# Patient Record
Sex: MALE | Race: WHITE | NOT HISPANIC OR LATINO | Employment: FULL TIME | URBAN - METROPOLITAN AREA
[De-identification: names, ages, dates, MRNs, and addresses within clinical notes are randomized per-mention and may not be internally consistent; named-entity substitution may affect disease eponyms.]

---

## 2020-02-28 ENCOUNTER — OFFICE VISIT (OUTPATIENT)
Dept: UROLOGY | Facility: CLINIC | Age: 65
End: 2020-02-28
Payer: COMMERCIAL

## 2020-02-28 VITALS
SYSTOLIC BLOOD PRESSURE: 137 MMHG | DIASTOLIC BLOOD PRESSURE: 72 MMHG | BODY MASS INDEX: 27.98 KG/M2 | HEIGHT: 75 IN | WEIGHT: 225 LBS | RESPIRATION RATE: 19 BRPM | HEART RATE: 79 BPM

## 2020-02-28 DIAGNOSIS — R97.20 ELEVATED PSA: Primary | ICD-10-CM

## 2020-02-28 DIAGNOSIS — N52.9 ERECTILE DYSFUNCTION, UNSPECIFIED ERECTILE DYSFUNCTION TYPE: ICD-10-CM

## 2020-02-28 DIAGNOSIS — N40.1 BPH WITH OBSTRUCTION/LOWER URINARY TRACT SYMPTOMS: ICD-10-CM

## 2020-02-28 DIAGNOSIS — N13.8 BPH WITH OBSTRUCTION/LOWER URINARY TRACT SYMPTOMS: ICD-10-CM

## 2020-02-28 PROCEDURE — 36415 COLL VENOUS BLD VENIPUNCTURE: CPT | Mod: S$GLB,,, | Performed by: NURSE PRACTITIONER

## 2020-02-28 PROCEDURE — 36415 PR COLLECTION VENOUS BLOOD,VENIPUNCTURE: ICD-10-PCS | Mod: S$GLB,,, | Performed by: NURSE PRACTITIONER

## 2020-02-28 PROCEDURE — 99213 PR OFFICE/OUTPT VISIT, EST, LEVL III, 20-29 MIN: ICD-10-PCS | Mod: S$GLB,,, | Performed by: NURSE PRACTITIONER

## 2020-02-28 PROCEDURE — 99213 OFFICE O/P EST LOW 20 MIN: CPT | Mod: S$GLB,,, | Performed by: NURSE PRACTITIONER

## 2020-02-28 RX ORDER — FLUTICASONE PROPIONATE 50 MCG
SPRAY, SUSPENSION (ML) NASAL
COMMUNITY
Start: 2020-01-03

## 2020-02-28 NOTE — PROGRESS NOTES
Chief Complaint: 6 month follow up elevated PSA     HPI:  63-year-old  male known to Dr. Lindsey who presents for six-month follow-up.  Patient was initially referred to us from his PCP for an elevated PSA.  His PSA numbers have decreased since initial findings, and percent free is not too worrisome so we continue to monitor this level closely.  He denies a family history of prostate cancer.  He BPH with minimal lower urinary tract symptoms.  Not taking any prostate medication at this time.  He is due for repeat PSA and JAMIE today.    PSA:  2017  2.72  1/2019  4.21  End of 1/2019 3.95 25 %free  8/22/19 3.3 27 %free    Patient also has a history erectile dysfunction.  He has tried Viagra in the past but it caused significant headache.  He is maintained on Tri Mix which is prescribed by his PCP and tolerates this medication well.    Allergies:  Patient has no known allergies.    Medications:  has a current medication list which includes the following prescription(s): afluria qd 2019-20(3yr up)(pf) and fluticasone propionate.    Review of Systems:  General: No fever, chills, vision changes, dizziness, weakness, fatigue, unexplained weight loss, confusion, or mood swings.  Skin: No rashes, itching, or changes in color/texture of skin.  Chest: Denies LOCKE, cyanosis, wheezing, cough, and sputum production.  Abdomen: Appetite fine. Denies diarrhea, abdominal pain, hematemesis, or blood in stool.  Musculoskeletal: No joint stiffness or swelling. Denies back pain.  : As above.  All other review of systems negative.    PMH:   has a past medical history of Allergy and Elevated PSA.    PSH:   has a past surgical history that includes Hernia repair; Tonsillectomy; Appendectomy; and Knee surgery.    FamHx: family history includes Diabetes in his mother; Hypertension in his mother.    SocHx:  reports that he has never smoked. He has never used smokeless tobacco. He reports that he does not drink alcohol or use drugs.       Physical Exam:  Vitals:    02/28/20 1001   BP: 137/72   Pulse: 79   Resp: 19     General: AAOx3, no apparent distress, no deformities  Neck: supple, no masses, normal thyroid, full ROM  Lungs: CTAB, normal inspiration, no use of accessory muscles  Heart: regular rate and rhythm, no arrhythmias  Abdomen: soft, NT, ND, no masses, no hernias, no hepatosplenomegaly  Lymphatic: no unusually enlarged or tender lymph nodes  Skin: warm and dry, no jaundice.  Ext: without edema or deformity.  : Normal rectal tone, no hemorrhoids. Prostate mildly enlarged, soft, smooth surface, no nodules or masses appreciated.     Labs/Studies: none    Impression/Plan:   Elevated PSA  Comments:  last PSA 3.3t/ 27%free which was improved from previous result; repeat PSA today and call patient with results  Orders:  -     PSA, total and free    BPH with obstruction/lower urinary tract symptoms  Comments:  minimal LUTS, no too bothersome, no prostate meds, JAMIE accomplished and benign    Erectile dysfunction, unspecified erectile dysfunction type  Comments:  stable, failed PDE5 inhibitors, maintained on TriMix that is prescribed by his PCP        Follow up in about 6 months (around 8/28/2020) for repeat PSA/JAMIE due 2/2021.

## 2020-03-05 LAB
PROSTATE SPECIFIC ANTIGEN, TOTAL: 2.7 NG/ML
PSA FREE MFR SERPL: 22 % (CALC)
PSA FREE SERPL-MCNC: 0.6 NG/ML

## 2020-03-06 ENCOUNTER — TELEPHONE (OUTPATIENT)
Dept: UROLOGY | Facility: CLINIC | Age: 65
End: 2020-03-06

## 2020-03-06 NOTE — TELEPHONE ENCOUNTER
Pt contacted, PSA results given, verbalized understanding. NB    ----- Message from Radha Abrams NP sent at 3/5/2020  5:28 PM CST -----  PSA improved, please call him his lab result

## 2020-09-17 ENCOUNTER — TELEPHONE (OUTPATIENT)
Dept: UROLOGY | Facility: CLINIC | Age: 65
End: 2020-09-17

## 2020-09-17 NOTE — TELEPHONE ENCOUNTER
Contacted pt whom states that his appt was canceled and would like to reschedule. Scheduled for 9/21. BJP      ----- Message from Mihaela Alvarez sent at 9/17/2020  8:47 AM CDT -----  Regarding: pt  Pt called regards to his appt. Pt wants to know why it was cancel. Please call back at [phone]

## 2020-09-21 ENCOUNTER — OFFICE VISIT (OUTPATIENT)
Dept: UROLOGY | Facility: CLINIC | Age: 65
End: 2020-09-21
Payer: COMMERCIAL

## 2020-09-21 VITALS
BODY MASS INDEX: 26.73 KG/M2 | DIASTOLIC BLOOD PRESSURE: 70 MMHG | HEIGHT: 75 IN | HEART RATE: 79 BPM | RESPIRATION RATE: 19 BRPM | WEIGHT: 215 LBS | SYSTOLIC BLOOD PRESSURE: 102 MMHG

## 2020-09-21 DIAGNOSIS — R97.20 ELEVATED PSA: ICD-10-CM

## 2020-09-21 DIAGNOSIS — N52.9 ERECTILE DYSFUNCTION, UNSPECIFIED ERECTILE DYSFUNCTION TYPE: Primary | ICD-10-CM

## 2020-09-21 DIAGNOSIS — N40.0 BPH WITHOUT URINARY OBSTRUCTION: ICD-10-CM

## 2020-09-21 PROCEDURE — 99213 OFFICE O/P EST LOW 20 MIN: CPT | Mod: S$GLB,,, | Performed by: SPECIALIST

## 2020-09-21 PROCEDURE — 99213 PR OFFICE/OUTPT VISIT, EST, LEVL III, 20-29 MIN: ICD-10-PCS | Mod: S$GLB,,, | Performed by: SPECIALIST

## 2020-09-21 RX ORDER — METFORMIN HYDROCHLORIDE 1000 MG/1
TABLET ORAL
COMMUNITY
Start: 2020-07-23

## 2020-09-21 RX ORDER — LISINOPRIL 5 MG/1
TABLET ORAL
COMMUNITY
Start: 2020-08-04

## 2020-09-21 RX ORDER — DEXTROAMPHETAMINE SACCHARATE, AMPHETAMINE ASPARTATE MONOHYDRATE, DEXTROAMPHETAMINE SULFATE AND AMPHETAMINE SULFATE 7.5; 7.5; 7.5; 7.5 MG/1; MG/1; MG/1; MG/1
30 CAPSULE, EXTENDED RELEASE ORAL EVERY MORNING
COMMUNITY
End: 2021-07-14

## 2020-09-21 RX ORDER — HYDROXYZINE HYDROCHLORIDE 25 MG/1
25 TABLET, FILM COATED ORAL 3 TIMES DAILY PRN
COMMUNITY
Start: 2020-06-29 | End: 2021-03-22

## 2020-09-21 RX ORDER — SIMVASTATIN 20 MG/1
TABLET, FILM COATED ORAL
COMMUNITY
Start: 2020-07-23

## 2020-09-21 RX ORDER — AMLODIPINE BESYLATE 2.5 MG/1
TABLET ORAL
COMMUNITY
Start: 2020-08-04 | End: 2022-05-09

## 2020-09-21 NOTE — PROGRESS NOTES
Subjective:       Patient ID: Fidel Hernandez is a 64 y.o. male.    Chief Complaint: Follow-up (6 th f/u)      HPI:  64-year-old man patient of mine who is here today for follow-up.  He was initially sent to us for elevated PSA.  We obtained a PSA from February of 2020 and was 2.7 ng/mL with a 22% free.  The previous PSA number was from August of 2019 and was 3.3 with 27% free.  We decided to observe him.  He underwent a digital rectal exam in February of 2020 as well which was benign.  See below for PSA history.    PSA History:   2017                 2.72  1/2019              4.21  End of 1/2019  3.95     25% free  8/22/19             3.3       27% free  02/2020            2.7       22% free    He has BPH without bothersome lower urinary tract symptoms.  He is not taking Flomax.  He empties his bladder very well.    He has problems with erections that the going on for several months of progressively worsening.  He has tried PD 5 inhibitors without success.  They do cause headache without inadequate satisfactory erection.  He has currently been on Tri Mix.  I am not sure of the quantity of the different components in the Tri Mix that he is using.  He uses 0.4 cc without a decent erection.  He is here today to explore different opportunities for the management of his erections.    Past Medical History:   Past Medical History:   Diagnosis Date    ADHD     Allergy     Diabetes mellitus     Elevated PSA     Hypercholesterolemia     Hypertension        Past Surgical Historical:   Past Surgical History:   Procedure Laterality Date    APPENDECTOMY      HERNIA REPAIR      KNEE SURGERY      TONSILLECTOMY          Medications:   Medication List with Changes/Refills   Current Medications    AFLURIA QD 2019-20,3YR UP,,PF, 60 MCG (15 MCG X 4)/0.5 ML SYRG    ADM 0.5ML IM UTD    AMLODIPINE (NORVASC) 2.5 MG TABLET        DEXTROAMPHETAMINE-AMPHETAMINE (ADDERALL XR) 30 MG 24 HR CAPSULE    Take 30 mg by mouth every morning.     FLUTICASONE PROPIONATE (FLONASE) 50 MCG/ACTUATION NASAL SPRAY    2 sprays to each nare daily    HYDROXYZINE HCL (ATARAX) 25 MG TABLET    Take 25 mg by mouth 3 (three) times daily as needed.    LISINOPRIL (PRINIVIL,ZESTRIL) 5 MG TABLET        METFORMIN (GLUCOPHAGE) 1000 MG TABLET        SIMVASTATIN (ZOCOR) 20 MG TABLET            Past Social History:   Social History     Socioeconomic History    Marital status:      Spouse name: Not on file    Number of children: Not on file    Years of education: Not on file    Highest education level: Not on file   Occupational History    Not on file   Social Needs    Financial resource strain: Not on file    Food insecurity     Worry: Not on file     Inability: Not on file    Transportation needs     Medical: Not on file     Non-medical: Not on file   Tobacco Use    Smoking status: Never Smoker    Smokeless tobacco: Never Used   Substance and Sexual Activity    Alcohol use: Never     Frequency: Never    Drug use: Never    Sexual activity: Not on file   Lifestyle    Physical activity     Days per week: Not on file     Minutes per session: Not on file    Stress: Not on file   Relationships    Social connections     Talks on phone: Not on file     Gets together: Not on file     Attends Jain service: Not on file     Active member of club or organization: Not on file     Attends meetings of clubs or organizations: Not on file     Relationship status: Not on file   Other Topics Concern    Not on file   Social History Narrative    Not on file       Allergies: Review of patient's allergies indicates:  No Known Allergies     Family History:   Family History   Problem Relation Age of Onset    Diabetes Mother     Hypertension Mother         Review of Systems:   systems reviewed and notable for erectile dysfunction and BPH.  History of elevated PSA  All other systems were reviewed Neg except as stated in the HPI    Physical Exam:  General: A&Ox3. No apparent  distress. No deformities.  Neck: No masses. Normal thyroid.  Lungs: normal inspiration. No use of accessory muscles.  Heart: normal pulse. No arrhythmias.  Abdomen: Soft. NT. ND. No masses. No hernias. No hepatosplenomegaly.  Lymphatic: Neck and groin nodes negative.  Skin: The skin is warm and dry. No jaundice.  Neurology: Cranial nerves 2-12 crossly intact, no focal weaknesses, no sensation deficits, no motor deficits  Ext: No clubbing, cyanosis or edema.  :  Deferred      Assessment/Plan:       64-year-old man presented to me today with worsening erectile dysfunction.    1.  Obtain blood for serum testosterone levels today  2.  We talked about a penile prosthesis.  He seems like that will be his next best option.  We reviewed the data on the penile prosthesis as well as looked at some films.  All his questions were addressed satisfactorily.  I talked to him at length about stretch penile length which is the closest measure of he has erectile length with the prosthesis in place.  3.  Patient return to clinic in 6 months for interim check  4.  Continue monitoring his PSA    Problem List Items Addressed This Visit     None      Visit Diagnoses     Erectile dysfunction, unspecified erectile dysfunction type    -  Primary    Relevant Orders    Testosterone    Elevated PSA        BPH without urinary obstruction

## 2020-09-24 ENCOUNTER — TELEPHONE (OUTPATIENT)
Dept: UROLOGY | Facility: CLINIC | Age: 65
End: 2020-09-24

## 2020-09-24 DIAGNOSIS — R97.20 ELEVATED PSA: Primary | ICD-10-CM

## 2020-09-24 NOTE — TELEPHONE ENCOUNTER
Fasting T-level order faxed to [facility] draw station [phone]. Note added on lab order to fax results. NB

## 2020-09-24 NOTE — TELEPHONE ENCOUNTER
Callback to pt, informed him that his T-levels are low and we would like to get him to come in for a fasting t-level draw. Pt verbalized understanding and requesting that he do it in [place] at [facility], informed pt that the order will be sent. KISHOR    ----- Message from Malena Dwyer sent at 9/24/2020 11:04 AM CDT -----  Type:  Patient Returning Call    Who Called:pt   Who Left Message for Patient:na  Does the patient know what this is regarding?:na  Would the patient rather a call back or a response via [facility]? Callback   Best Call Back Number:[phone]   Additional Information:

## 2020-09-30 ENCOUNTER — TELEPHONE (OUTPATIENT)
Dept: UROLOGY | Facility: CLINIC | Age: 65
End: 2020-09-30

## 2020-09-30 NOTE — TELEPHONE ENCOUNTER
64/M with worsening ED (on TriMix & considering IPP)    Testosterone level on 9/21/2020 resulted at 252 ng/dL. Fasting testosterone level ordered on 9/28/2020 resulted at 300 ng/dL.    Were you wanting to start TRT?

## 2020-10-01 ENCOUNTER — TELEPHONE (OUTPATIENT)
Dept: UROLOGY | Facility: CLINIC | Age: 65
End: 2020-10-01

## 2020-10-01 NOTE — TELEPHONE ENCOUNTER
----- Message from Carolyn Godinez sent at 10/1/2020 10:50 AM CDT -----  .Type:  Test Results    Who Called: Patient   Name of Test (Lab/Mammo/Etc):  blood work   Date of Test:   Ordering Provider: dr. Lindsey   Where the test was performed: [facility]   Would the patient rather a call back or a response via [facility]?  call  Best Call Back Number: [phone]  Additional Information:  n/a

## 2020-10-01 NOTE — TELEPHONE ENCOUNTER
Attempted to return patient's call, we are attempting to reach him to see if he is interested in TRT as his T-level is low, no answer, LVM to call back.

## 2020-10-01 NOTE — TELEPHONE ENCOUNTER
Attempted to call patient to update on plan of care, if he calls back let him know that his testosterone level is low and we can start him on replacement therapy if he is interested. Let me know if he is and I can put the order in.

## 2020-10-01 NOTE — TELEPHONE ENCOUNTER
We can attempt a 3 month trial of TRT if he has no contraindications.  And see if that helps some.  I think 3 months would be reasonable time.

## 2020-10-01 NOTE — TELEPHONE ENCOUNTER
Contacted pt informed him of his testosterone levels being low, advised him if he would like we could start him on replacement therapy if he is interested. Pt states that his work schedule is pretty tight at the moment and states that a patch or pill will work better for him. I informed pt that I would inform  And will contact him with more information. Pt verbalized understanding. KISHOR

## 2021-03-22 ENCOUNTER — OFFICE VISIT (OUTPATIENT)
Dept: UROLOGY | Facility: CLINIC | Age: 66
End: 2021-03-22
Payer: MEDICARE

## 2021-03-22 VITALS
HEIGHT: 75 IN | WEIGHT: 230 LBS | SYSTOLIC BLOOD PRESSURE: 137 MMHG | HEART RATE: 81 BPM | DIASTOLIC BLOOD PRESSURE: 75 MMHG | BODY MASS INDEX: 28.6 KG/M2

## 2021-03-22 DIAGNOSIS — E29.1 HYPOGONADISM IN MALE: ICD-10-CM

## 2021-03-22 DIAGNOSIS — R97.20 ELEVATED PSA: Primary | ICD-10-CM

## 2021-03-22 DIAGNOSIS — N13.8 BPH WITH OBSTRUCTION/LOWER URINARY TRACT SYMPTOMS: ICD-10-CM

## 2021-03-22 DIAGNOSIS — N52.9 ERECTILE DYSFUNCTION, UNSPECIFIED ERECTILE DYSFUNCTION TYPE: ICD-10-CM

## 2021-03-22 DIAGNOSIS — N40.1 BPH WITH OBSTRUCTION/LOWER URINARY TRACT SYMPTOMS: ICD-10-CM

## 2021-03-22 LAB
PSA, DIAGNOSTIC: 5.31 NG/ML (ref 0–4)
TESTOST SERPL-MCNC: 226 NG/DL (ref 193–740)

## 2021-03-22 PROCEDURE — 96372 THER/PROPH/DIAG INJ SC/IM: CPT | Mod: S$GLB,,, | Performed by: SPECIALIST

## 2021-03-22 PROCEDURE — 81001 URINALYSIS AUTO W/SCOPE: CPT | Mod: S$GLB,,, | Performed by: NURSE PRACTITIONER

## 2021-03-22 PROCEDURE — 99214 OFFICE O/P EST MOD 30 MIN: CPT | Mod: 25,S$GLB,, | Performed by: SPECIALIST

## 2021-03-22 PROCEDURE — 81001 PR  URINALYSIS, AUTO, W/SCOPE: ICD-10-PCS | Mod: S$GLB,,, | Performed by: NURSE PRACTITIONER

## 2021-03-22 PROCEDURE — 36415 PR COLLECTION VENOUS BLOOD,VENIPUNCTURE: ICD-10-PCS | Mod: S$GLB,,, | Performed by: NURSE PRACTITIONER

## 2021-03-22 PROCEDURE — 96372 PR INJECTION,THERAP/PROPH/DIAG2ST, IM OR SUBCUT: ICD-10-PCS | Mod: S$GLB,,, | Performed by: SPECIALIST

## 2021-03-22 PROCEDURE — 36415 COLL VENOUS BLD VENIPUNCTURE: CPT | Mod: S$GLB,,, | Performed by: NURSE PRACTITIONER

## 2021-03-22 PROCEDURE — 99214 PR OFFICE/OUTPT VISIT, EST, LEVL IV, 30-39 MIN: ICD-10-PCS | Mod: 25,S$GLB,, | Performed by: SPECIALIST

## 2021-03-22 RX ORDER — TRIAMCINOLONE ACETONIDE 1 MG/G
CREAM TOPICAL
COMMUNITY
Start: 2021-03-03 | End: 2022-05-09

## 2021-03-22 RX ORDER — ALLOPURINOL 100 MG/1
100 TABLET ORAL DAILY
COMMUNITY
End: 2022-05-09 | Stop reason: DRUGHIGH

## 2021-03-22 RX ORDER — TESTOSTERONE CYPIONATE 200 MG/ML
200 INJECTION, SOLUTION INTRAMUSCULAR
Status: SHIPPED | OUTPATIENT
Start: 2021-03-22 | End: 2021-06-14

## 2021-03-22 RX ORDER — DEXTROAMPHETAMINE SACCHARATE, AMPHETAMINE ASPARTATE, DEXTROAMPHETAMINE SULFATE AND AMPHETAMINE SULFATE 5; 5; 5; 5 MG/1; MG/1; MG/1; MG/1
TABLET ORAL
COMMUNITY
Start: 2021-02-04

## 2021-03-22 RX ADMIN — TESTOSTERONE CYPIONATE 200 MG: 200 INJECTION, SOLUTION INTRAMUSCULAR at 10:03

## 2021-03-23 ENCOUNTER — TELEPHONE (OUTPATIENT)
Dept: UROLOGY | Facility: CLINIC | Age: 66
End: 2021-03-23

## 2021-03-23 DIAGNOSIS — R97.20 ELEVATED PSA: Primary | ICD-10-CM

## 2021-04-06 ENCOUNTER — CLINICAL SUPPORT (OUTPATIENT)
Dept: UROLOGY | Facility: CLINIC | Age: 66
End: 2021-04-06
Payer: MEDICARE

## 2021-04-06 DIAGNOSIS — E29.1 HYPOGONADISM IN MALE: Primary | ICD-10-CM

## 2021-04-06 PROCEDURE — 96372 PR INJECTION,THERAP/PROPH/DIAG2ST, IM OR SUBCUT: ICD-10-PCS | Mod: S$GLB,,, | Performed by: SPECIALIST

## 2021-04-06 PROCEDURE — 96372 THER/PROPH/DIAG INJ SC/IM: CPT | Mod: S$GLB,,, | Performed by: SPECIALIST

## 2021-04-06 RX ORDER — TESTOSTERONE CYPIONATE 200 MG/ML
200 INJECTION, SOLUTION INTRAMUSCULAR
Status: SHIPPED | OUTPATIENT
Start: 2021-04-06 | End: 2021-06-29

## 2021-04-06 RX ADMIN — TESTOSTERONE CYPIONATE 200 MG: 200 INJECTION, SOLUTION INTRAMUSCULAR at 09:04

## 2021-04-09 ENCOUNTER — PROCEDURE VISIT (OUTPATIENT)
Dept: UROLOGY | Facility: CLINIC | Age: 66
End: 2021-04-09
Payer: MEDICARE

## 2021-04-09 VITALS
RESPIRATION RATE: 18 BRPM | SYSTOLIC BLOOD PRESSURE: 133 MMHG | OXYGEN SATURATION: 98 % | DIASTOLIC BLOOD PRESSURE: 67 MMHG | BODY MASS INDEX: 29.42 KG/M2 | HEIGHT: 75 IN | WEIGHT: 236.63 LBS | HEART RATE: 74 BPM

## 2021-04-09 DIAGNOSIS — R97.20 ELEVATED PSA: Primary | ICD-10-CM

## 2021-04-09 PROCEDURE — 76872 US TRANSRECTAL: CPT | Mod: S$GLB,,, | Performed by: SPECIALIST

## 2021-04-09 PROCEDURE — 55700 PR BIOPSY OF PROSTATE,NEEDLE/PUNCH: ICD-10-PCS | Mod: S$GLB,,, | Performed by: SPECIALIST

## 2021-04-09 PROCEDURE — 55700 PR BIOPSY OF PROSTATE,NEEDLE/PUNCH: CPT | Mod: S$GLB,,, | Performed by: SPECIALIST

## 2021-04-09 PROCEDURE — 96372 PR INJECTION,THERAP/PROPH/DIAG2ST, IM OR SUBCUT: ICD-10-PCS | Mod: 59,S$GLB,, | Performed by: SPECIALIST

## 2021-04-09 PROCEDURE — 96372 THER/PROPH/DIAG INJ SC/IM: CPT | Mod: 59,S$GLB,, | Performed by: SPECIALIST

## 2021-04-09 PROCEDURE — 76872 PR US TRANSRECTAL: ICD-10-PCS | Mod: S$GLB,,, | Performed by: SPECIALIST

## 2021-04-09 RX ORDER — CEFTRIAXONE 1 G/1
1 INJECTION, POWDER, FOR SOLUTION INTRAMUSCULAR; INTRAVENOUS
Status: COMPLETED | OUTPATIENT
Start: 2021-04-09 | End: 2021-04-09

## 2021-04-09 RX ORDER — DIAZEPAM 10 MG/1
10 TABLET ORAL
Status: COMPLETED | OUTPATIENT
Start: 2021-04-09 | End: 2021-04-09

## 2021-04-09 RX ADMIN — CEFTRIAXONE 1 G: 1 INJECTION, POWDER, FOR SOLUTION INTRAMUSCULAR; INTRAVENOUS at 01:04

## 2021-04-09 RX ADMIN — DIAZEPAM 10 MG: 10 TABLET ORAL at 01:04

## 2021-04-20 ENCOUNTER — CLINICAL SUPPORT (OUTPATIENT)
Dept: UROLOGY | Facility: CLINIC | Age: 66
End: 2021-04-20
Payer: MEDICARE

## 2021-04-20 DIAGNOSIS — E29.1 HYPOGONADISM IN MALE: Primary | ICD-10-CM

## 2021-04-20 PROCEDURE — 96372 THER/PROPH/DIAG INJ SC/IM: CPT | Mod: S$GLB,,, | Performed by: SPECIALIST

## 2021-04-20 PROCEDURE — 96372 PR INJECTION,THERAP/PROPH/DIAG2ST, IM OR SUBCUT: ICD-10-PCS | Mod: S$GLB,,, | Performed by: SPECIALIST

## 2021-04-20 RX ADMIN — TESTOSTERONE CYPIONATE 200 MG: 200 INJECTION, SOLUTION INTRAMUSCULAR at 09:04

## 2021-04-23 ENCOUNTER — OFFICE VISIT (OUTPATIENT)
Dept: UROLOGY | Facility: CLINIC | Age: 66
End: 2021-04-23
Payer: MEDICARE

## 2021-04-23 VITALS
RESPIRATION RATE: 16 BRPM | HEIGHT: 75 IN | DIASTOLIC BLOOD PRESSURE: 72 MMHG | SYSTOLIC BLOOD PRESSURE: 151 MMHG | BODY MASS INDEX: 29.34 KG/M2 | WEIGHT: 236 LBS

## 2021-04-23 DIAGNOSIS — N13.8 BPH WITH OBSTRUCTION/LOWER URINARY TRACT SYMPTOMS: ICD-10-CM

## 2021-04-23 DIAGNOSIS — E29.1 HYPOGONADISM IN MALE: ICD-10-CM

## 2021-04-23 DIAGNOSIS — N40.1 BPH WITH OBSTRUCTION/LOWER URINARY TRACT SYMPTOMS: ICD-10-CM

## 2021-04-23 DIAGNOSIS — N52.9 ERECTILE DYSFUNCTION, UNSPECIFIED ERECTILE DYSFUNCTION TYPE: ICD-10-CM

## 2021-04-23 DIAGNOSIS — R97.20 ELEVATED PSA: Primary | ICD-10-CM

## 2021-04-23 PROCEDURE — 99213 PR OFFICE/OUTPT VISIT, EST, LEVL III, 20-29 MIN: ICD-10-PCS | Mod: S$GLB,,, | Performed by: SPECIALIST

## 2021-04-23 PROCEDURE — 99213 OFFICE O/P EST LOW 20 MIN: CPT | Mod: S$GLB,,, | Performed by: SPECIALIST

## 2021-04-23 RX ORDER — VITAMIN B COMPLEX
1 CAPSULE ORAL DAILY
COMMUNITY

## 2021-04-23 RX ORDER — DOCUSATE SODIUM 250 MG
250 CAPSULE ORAL DAILY
COMMUNITY

## 2021-05-04 ENCOUNTER — CLINICAL SUPPORT (OUTPATIENT)
Dept: UROLOGY | Facility: CLINIC | Age: 66
End: 2021-05-04
Payer: MEDICARE

## 2021-05-04 DIAGNOSIS — E29.1 HYPOGONADISM IN MALE: Primary | ICD-10-CM

## 2021-05-04 PROCEDURE — 96372 PR INJECTION,THERAP/PROPH/DIAG2ST, IM OR SUBCUT: ICD-10-PCS | Mod: S$GLB,,, | Performed by: SPECIALIST

## 2021-05-04 PROCEDURE — 96372 THER/PROPH/DIAG INJ SC/IM: CPT | Mod: S$GLB,,, | Performed by: SPECIALIST

## 2021-05-04 RX ADMIN — TESTOSTERONE CYPIONATE 200 MG: 200 INJECTION, SOLUTION INTRAMUSCULAR at 09:05

## 2021-06-16 ENCOUNTER — CLINICAL SUPPORT (OUTPATIENT)
Dept: UROLOGY | Facility: CLINIC | Age: 66
End: 2021-06-16
Payer: MEDICARE

## 2021-06-16 DIAGNOSIS — E29.1 HYPOGONADISM MALE: Primary | ICD-10-CM

## 2021-06-16 PROCEDURE — 96372 THER/PROPH/DIAG INJ SC/IM: CPT | Mod: S$GLB,,, | Performed by: SPECIALIST

## 2021-06-16 PROCEDURE — 96372 PR INJECTION,THERAP/PROPH/DIAG2ST, IM OR SUBCUT: ICD-10-PCS | Mod: S$GLB,,, | Performed by: SPECIALIST

## 2021-06-16 RX ADMIN — TESTOSTERONE CYPIONATE 200 MG: 200 INJECTION, SOLUTION INTRAMUSCULAR at 09:06

## 2021-06-30 ENCOUNTER — CLINICAL SUPPORT (OUTPATIENT)
Dept: UROLOGY | Facility: CLINIC | Age: 66
End: 2021-06-30
Payer: MEDICARE

## 2021-06-30 DIAGNOSIS — E29.1 HYPOGONADISM MALE: Primary | ICD-10-CM

## 2021-06-30 PROCEDURE — 96372 THER/PROPH/DIAG INJ SC/IM: CPT | Mod: S$GLB,,, | Performed by: SPECIALIST

## 2021-06-30 PROCEDURE — 96372 PR INJECTION,THERAP/PROPH/DIAG2ST, IM OR SUBCUT: ICD-10-PCS | Mod: S$GLB,,, | Performed by: SPECIALIST

## 2021-06-30 RX ADMIN — TESTOSTERONE CYPIONATE 200 MG: 200 INJECTION, SOLUTION INTRAMUSCULAR at 09:06

## 2021-07-14 ENCOUNTER — OFFICE VISIT (OUTPATIENT)
Dept: UROLOGY | Facility: CLINIC | Age: 66
End: 2021-07-14
Payer: MEDICARE

## 2021-07-14 DIAGNOSIS — N52.9 ERECTILE DYSFUNCTION, UNSPECIFIED ERECTILE DYSFUNCTION TYPE: ICD-10-CM

## 2021-07-14 DIAGNOSIS — E29.1 HYPOGONADISM MALE: ICD-10-CM

## 2021-07-14 DIAGNOSIS — R97.20 ELEVATED PSA: Primary | ICD-10-CM

## 2021-07-14 PROCEDURE — 96372 THER/PROPH/DIAG INJ SC/IM: CPT | Mod: S$GLB,,, | Performed by: NURSE PRACTITIONER

## 2021-07-14 PROCEDURE — 99214 OFFICE O/P EST MOD 30 MIN: CPT | Mod: 25,S$GLB,, | Performed by: NURSE PRACTITIONER

## 2021-07-14 PROCEDURE — 96372 PR INJECTION,THERAP/PROPH/DIAG2ST, IM OR SUBCUT: ICD-10-PCS | Mod: S$GLB,,, | Performed by: NURSE PRACTITIONER

## 2021-07-14 PROCEDURE — 99214 PR OFFICE/OUTPT VISIT, EST, LEVL IV, 30-39 MIN: ICD-10-PCS | Mod: 25,S$GLB,, | Performed by: NURSE PRACTITIONER

## 2021-07-14 RX ORDER — TESTOSTERONE CYPIONATE 200 MG/ML
200 INJECTION, SOLUTION INTRAMUSCULAR ONCE
Status: COMPLETED | OUTPATIENT
Start: 2021-07-14 | End: 2021-07-14

## 2021-07-14 RX ADMIN — TESTOSTERONE CYPIONATE 200 MG: 200 INJECTION, SOLUTION INTRAMUSCULAR at 09:07

## 2021-07-26 ENCOUNTER — TELEPHONE (OUTPATIENT)
Dept: UROLOGY | Facility: CLINIC | Age: 66
End: 2021-07-26

## 2021-07-28 ENCOUNTER — OFFICE VISIT (OUTPATIENT)
Dept: UROLOGY | Facility: CLINIC | Age: 66
End: 2021-07-28
Payer: MEDICARE

## 2021-07-28 DIAGNOSIS — N52.9 ERECTILE DYSFUNCTION, UNSPECIFIED ERECTILE DYSFUNCTION TYPE: ICD-10-CM

## 2021-07-28 DIAGNOSIS — E29.1 HYPOGONADISM MALE: ICD-10-CM

## 2021-07-28 DIAGNOSIS — R97.20 ELEVATED PSA: Primary | ICD-10-CM

## 2021-07-28 PROCEDURE — 99214 PR OFFICE/OUTPT VISIT, EST, LEVL IV, 30-39 MIN: ICD-10-PCS | Mod: S$GLB,,, | Performed by: NURSE PRACTITIONER

## 2021-07-28 PROCEDURE — 99214 OFFICE O/P EST MOD 30 MIN: CPT | Mod: S$GLB,,, | Performed by: NURSE PRACTITIONER

## 2021-09-20 ENCOUNTER — OFFICE VISIT (OUTPATIENT)
Dept: UROLOGY | Facility: CLINIC | Age: 66
End: 2021-09-20
Payer: MEDICARE

## 2021-09-20 VITALS
WEIGHT: 236 LBS | SYSTOLIC BLOOD PRESSURE: 126 MMHG | BODY MASS INDEX: 29.34 KG/M2 | HEIGHT: 75 IN | DIASTOLIC BLOOD PRESSURE: 67 MMHG | RESPIRATION RATE: 18 BRPM | HEART RATE: 76 BPM

## 2021-09-20 DIAGNOSIS — E29.1 HYPOGONADISM MALE: ICD-10-CM

## 2021-09-20 DIAGNOSIS — R97.20 ELEVATED PSA: Primary | ICD-10-CM

## 2021-09-20 PROCEDURE — 99214 OFFICE O/P EST MOD 30 MIN: CPT | Mod: S$GLB,,, | Performed by: NURSE PRACTITIONER

## 2021-09-20 PROCEDURE — 99214 PR OFFICE/OUTPT VISIT, EST, LEVL IV, 30-39 MIN: ICD-10-PCS | Mod: S$GLB,,, | Performed by: NURSE PRACTITIONER

## 2021-09-20 RX ORDER — CIPROFLOXACIN 500 MG/1
500 TABLET ORAL 2 TIMES DAILY
Qty: 28 TABLET | Refills: 0 | Status: SHIPPED | OUTPATIENT
Start: 2021-09-20 | End: 2021-10-04

## 2021-11-08 ENCOUNTER — OFFICE VISIT (OUTPATIENT)
Dept: UROLOGY | Facility: CLINIC | Age: 66
End: 2021-11-08
Payer: MEDICARE

## 2021-11-08 DIAGNOSIS — N52.9 ERECTILE DYSFUNCTION, UNSPECIFIED ERECTILE DYSFUNCTION TYPE: ICD-10-CM

## 2021-11-08 DIAGNOSIS — R97.20 ELEVATED PSA: Primary | ICD-10-CM

## 2021-11-08 DIAGNOSIS — E29.1 HYPOGONADISM MALE: ICD-10-CM

## 2021-11-08 PROCEDURE — 99214 PR OFFICE/OUTPT VISIT, EST, LEVL IV, 30-39 MIN: ICD-10-PCS | Mod: S$GLB,,, | Performed by: NURSE PRACTITIONER

## 2021-11-08 PROCEDURE — 99214 OFFICE O/P EST MOD 30 MIN: CPT | Mod: S$GLB,,, | Performed by: NURSE PRACTITIONER

## 2022-01-14 LAB — PSA, DIAGNOSTIC: 6.99 NG/ML (ref 0–4)

## 2022-01-18 ENCOUNTER — TELEPHONE (OUTPATIENT)
Dept: UROLOGY | Facility: CLINIC | Age: 67
End: 2022-01-18
Payer: MEDICARE

## 2022-01-18 RX ORDER — CIPROFLOXACIN 500 MG/1
500 TABLET ORAL 2 TIMES DAILY
Qty: 28 TABLET | Refills: 0 | Status: SHIPPED | OUTPATIENT
Start: 2022-01-18 | End: 2022-02-01

## 2022-01-18 NOTE — TELEPHONE ENCOUNTER
----- Message from Orlando Kaplan NP sent at 1/18/2022  9:02 AM CST -----  PSA has increased since October.  Was 5.81 and is now 6.99.  Negative biopsy 04/2021.  Treat with antibiotics and change next visit scheduled with me to Dr. Lin.  Cipro sent to [facility] in [place].

## 2022-01-21 ENCOUNTER — OFFICE VISIT (OUTPATIENT)
Dept: UROLOGY | Facility: CLINIC | Age: 67
End: 2022-01-21
Payer: MEDICARE

## 2022-01-21 DIAGNOSIS — R97.20 ELEVATED PSA: Primary | ICD-10-CM

## 2022-01-21 PROCEDURE — 99213 OFFICE O/P EST LOW 20 MIN: CPT | Mod: S$GLB,,, | Performed by: NURSE PRACTITIONER

## 2022-01-21 PROCEDURE — 99213 PR OFFICE/OUTPT VISIT, EST, LEVL III, 20-29 MIN: ICD-10-PCS | Mod: S$GLB,,, | Performed by: NURSE PRACTITIONER

## 2022-01-21 NOTE — TELEPHONE ENCOUNTER
Called pt to notify, he stated he is on his way here for appointment so I advised we see him then.

## 2022-01-21 NOTE — PROGRESS NOTES
Subjective:       Patient ID: Fidel Hernandez is a 66 y.o. male.    Chief Complaint: No chief complaint on file.      HPI: 66-year-old male, established patient, presents to discuss lab results.  Patient has history of elevated PSA.  Patient had recent labs on 01/14/2022 which were 6.99.    Patient biopsy in April 2021 due to a PSA of 5.31.  Samples were benign.  Patient's PSA after biopsy in July 2021 was 8.63.  Patient was on testosterone.  At that time patient decided to stop testosterone treatment.  Patient's PSA in September of 2021 was 7.06.  At that time patient was treated with Cipro.  Repeat PSA after antibiotics in November 2021 decreased to 5.81.  PSA has since again elevated.    At time of biopsy patient's PSA was measured to be 74.37 g.     Patient denies any pain or burning urination.  Denies any blood in urine.  Denies any significant weight loss.  Denies any new onset bone pain.  No other urinary complaints at this time.       Past Medical History:   Past Medical History:   Diagnosis Date    ADHD     Allergy     Diabetes mellitus     Elevated PSA     Hypercholesterolemia     Hypertension        Past Surgical Historical:   Past Surgical History:   Procedure Laterality Date    APPENDECTOMY      HERNIA REPAIR      KNEE SURGERY      TONSILLECTOMY          Medications:   Medication List with Changes/Refills   Current Medications    AFLURIA QD 2019-20,3YR UP,,PF, 60 MCG (15 MCG X 4)/0.5 ML SYRG    ADM 0.5ML IM UTD    ALLOPURINOL (ZYLOPRIM) 100 MG TABLET    Take 100 mg by mouth once daily.    AMLODIPINE (NORVASC) 2.5 MG TABLET        B COMPLEX VITAMINS CAPSULE    Take 1 capsule by mouth once daily. Take B1 and B12    CIPROFLOXACIN HCL (CIPRO) 500 MG TABLET    Take 1 tablet (500 mg total) by mouth 2 (two) times daily. for 14 days    DEXTROAMPHETAMINE-AMPHETAMINE (ADDERALL) 20 MG TABLET        DOCUSATE SODIUM (COLACE) 250 MG CAPSULE    Take 250 mg by mouth once daily.    FLUTICASONE PROPIONATE  (FLONASE) 50 MCG/ACTUATION NASAL SPRAY    2 sprays to each nare daily    LISINOPRIL (PRINIVIL,ZESTRIL) 5 MG TABLET        METFORMIN (GLUCOPHAGE) 1000 MG TABLET        MULTIVITAMIN CAPSULE    Take 1 capsule by mouth once daily.    SIMVASTATIN (ZOCOR) 20 MG TABLET        TRIAMCINOLONE ACETONIDE 0.1% (KENALOG) 0.1 % CREAM            Past Social History:   Social History     Socioeconomic History    Marital status:    Tobacco Use    Smoking status: Never Smoker    Smokeless tobacco: Never Used   Substance and Sexual Activity    Alcohol use: Never    Drug use: Never       Allergies: Review of patient's allergies indicates:  No Known Allergies     Family History:   Family History   Problem Relation Age of Onset    Diabetes Mother     Hypertension Mother         Review of Systems:  Review of Systems   Constitutional: Negative for activity change and appetite change.   HENT: Negative for congestion and dental problem.    Respiratory: Negative for chest tightness and shortness of breath.    Cardiovascular: Negative for chest pain.   Gastrointestinal: Negative for abdominal distention and abdominal pain.   Genitourinary: Negative for decreased urine volume, difficulty urinating, dysuria, enuresis, flank pain, frequency, genital sores, hematuria, penile discharge, penile pain, penile swelling, scrotal swelling, testicular pain and urgency.   Musculoskeletal: Negative for back pain and neck pain.   Neurological: Negative for dizziness.   Hematological: Negative for adenopathy.   Psychiatric/Behavioral: Negative for agitation, behavioral problems and confusion.       Physical Exam:  Physical Exam  Vitals and nursing note reviewed.   Constitutional:       Appearance: He is well-developed and well-nourished.   HENT:      Head: Normocephalic.   Cardiovascular:      Rate and Rhythm: Normal rate and regular rhythm.      Heart sounds: Normal heart sounds.   Pulmonary:      Effort: Pulmonary effort is normal.      Breath  sounds: Normal breath sounds.   Abdominal:      General: Bowel sounds are normal.      Palpations: Abdomen is soft.   Skin:     General: Skin is warm and dry.   Neurological:      Mental Status: He is alert and oriented to person, place, and time.         Assessment/Plan:   Elevated PSA:  Patient's PSA has been continue to elevate since last biopsy.  Did discuss possible reasons for elevated PSA, such as infection, previous testosterone treatment, and a large prostate.  After having discussions, patient has opted to repeat biopsy.  Will arrange biopsy with Dr. Lin.    Follow-up to be arranged.  Problem List Items Addressed This Visit        Renal/    Elevated PSA - Primary    Overview     Biopsy on 04/09/2021 due to PSA of 5.31.  12/12 - benign.    Recent PSA history.  07/2021 - 8.63.  09/2021 - 7.06.  Started on antibiotics.  11/2021 - 5.81.  01/2022 - 6.99.         Relevant Orders    Transrectal Ultrasound w/ Biopsy

## 2022-01-31 ENCOUNTER — TELEPHONE (OUTPATIENT)
Dept: UROLOGY | Facility: CLINIC | Age: 67
End: 2022-01-31
Payer: MEDICARE

## 2022-01-31 NOTE — TELEPHONE ENCOUNTER
Contacted pt, he states that he would like to cancel his procedure. He already contacted [facility] and they advised him that they will contact us for record. Advised pt to come in and sign a release of medical records form. Pt verbalized understanding. BJP      ----- Message from Erma Perry sent at 1/31/2022  9:43 AM CST -----  Pt has decided to go to [facility] and would like a call back about getting his records    Please let me know if I can cx the order

## 2022-05-09 ENCOUNTER — OFFICE VISIT (OUTPATIENT)
Dept: UROLOGY | Facility: CLINIC | Age: 67
End: 2022-05-09
Payer: MEDICARE

## 2022-05-09 VITALS
BODY MASS INDEX: 29.84 KG/M2 | SYSTOLIC BLOOD PRESSURE: 149 MMHG | HEART RATE: 87 BPM | DIASTOLIC BLOOD PRESSURE: 75 MMHG | WEIGHT: 240 LBS | HEIGHT: 75 IN

## 2022-05-09 DIAGNOSIS — R39.15 URINARY URGENCY: ICD-10-CM

## 2022-05-09 DIAGNOSIS — R35.0 URINARY FREQUENCY: ICD-10-CM

## 2022-05-09 DIAGNOSIS — R35.1 NOCTURIA: ICD-10-CM

## 2022-05-09 DIAGNOSIS — R97.20 ELEVATED PSA: Primary | ICD-10-CM

## 2022-05-09 LAB — POC RESIDUAL URINE VOLUME: 96 ML (ref 0–100)

## 2022-05-09 PROCEDURE — 99214 OFFICE O/P EST MOD 30 MIN: CPT | Mod: S$GLB,,, | Performed by: NURSE PRACTITIONER

## 2022-05-09 PROCEDURE — 51798 POCT BLADDER SCAN: ICD-10-PCS | Mod: S$GLB,,, | Performed by: NURSE PRACTITIONER

## 2022-05-09 PROCEDURE — 51798 US URINE CAPACITY MEASURE: CPT | Mod: S$GLB,,, | Performed by: NURSE PRACTITIONER

## 2022-05-09 PROCEDURE — 99214 PR OFFICE/OUTPT VISIT, EST, LEVL IV, 30-39 MIN: ICD-10-PCS | Mod: S$GLB,,, | Performed by: NURSE PRACTITIONER

## 2022-05-09 RX ORDER — IBUPROFEN 100 MG/5ML
1000 SUSPENSION, ORAL (FINAL DOSE FORM) ORAL
COMMUNITY

## 2022-05-09 RX ORDER — ALLOPURINOL 300 MG/1
TABLET ORAL
COMMUNITY
Start: 2022-02-11

## 2022-05-09 RX ORDER — ZINC GLUCONATE 50 MG
50 TABLET ORAL
COMMUNITY

## 2022-05-09 RX ORDER — CHOLECALCIFEROL (VITAMIN D3) 1250 MCG
50000 TABLET ORAL
COMMUNITY

## 2022-05-09 RX ORDER — FUROSEMIDE 20 MG/1
TABLET ORAL
COMMUNITY
Start: 2022-02-10 | End: 2022-05-09

## 2022-05-09 NOTE — PROGRESS NOTES
Subjective:       Patient ID: Fidel Hernandez is a 66 y.o. male.    Chief Complaint: Elevated PSA (6mth fu)      HPI: 66-year-old male, established patient, presents for 6 month visit.  Patient has a history of an elevated PSA.  Patient had a biopsy in April of 2021 due to a PSA of 5.31.  All 12 samples were benign.  Back in January of this year patient had a PSA of 6.99.    Patient decided to go to [facility] for further evaluation.  Patient had MRI with a PI RADS score of 3.  According to the patient, [facility] is rechecking the PSA in July and if elevated may repeat biopsy.    The patient is complaining of some frequency, urgency, and nocturia.  Patient states he gets up 3+ times per night.  He states if he gets up 2 times at night that is a good night.  He does admit to drinking some water at night.  He does not drink any coffee or sodas.  Does complain of frequency and episodes of urgency during the day.  He does not have any leakage.    Patient denies any pain or burning urination.  Denies any odor the urine.  Denies any fever.  Denies any body aches.  Denies any blood in urine.  denies any significant weight loss.  Denies any new onset bone pain.  No other urinary complaints at this time.       Past Medical History:   Past Medical History:   Diagnosis Date    ADHD     Allergy     Diabetes mellitus     Elevated PSA     Hypercholesterolemia     Hypertension        Past Surgical Historical:   Past Surgical History:   Procedure Laterality Date    APPENDECTOMY      Back injection  04/2022    HERNIA REPAIR      KNEE SURGERY      TONSILLECTOMY          Medications:   Medication List with Changes/Refills   Current Medications    AFLURIA QD 2019-20,3YR UP,,PF, 60 MCG (15 MCG X 4)/0.5 ML SYRG    ADM 0.5ML IM UTD    ALLOPURINOL (ZYLOPRIM) 300 MG TABLET        AMLODIPINE (NORVASC) 2.5 MG TABLET        ASCORBIC ACID, VITAMIN C, (VITAMIN C) 1000 MG TABLET    Take 1,000 mg by mouth.    B COMPLEX VITAMINS CAPSULE     Take 1 capsule by mouth once daily. Take B1 and B12    CHOLECALCIFEROL, VITAMIN D3, 1,250 MCG (50,000 UNIT) TAB    Take 50,000 Units by mouth.    DEXTROAMPHETAMINE-AMPHETAMINE (ADDERALL) 20 MG TABLET        DOCUSATE SODIUM (COLACE) 250 MG CAPSULE    Take 250 mg by mouth once daily.    FLUTICASONE PROPIONATE (FLONASE) 50 MCG/ACTUATION NASAL SPRAY    2 sprays to each nare daily    FUROSEMIDE (LASIX) 20 MG TABLET        LISINOPRIL (PRINIVIL,ZESTRIL) 5 MG TABLET        METFORMIN (GLUCOPHAGE) 1000 MG TABLET        MULTIVITAMIN CAPSULE    Take 1 capsule by mouth once daily.    SIMVASTATIN (ZOCOR) 20 MG TABLET        TRIAMCINOLONE ACETONIDE 0.1% (KENALOG) 0.1 % CREAM        ZINC GLUCONATE 50 MG TABLET    Take 50 mg by mouth.   Discontinued Medications    ALLOPURINOL (ZYLOPRIM) 100 MG TABLET    Take 100 mg by mouth once daily.        Past Social History:   Social History     Socioeconomic History    Marital status:    Tobacco Use    Smoking status: Never Smoker    Smokeless tobacco: Never Used   Substance and Sexual Activity    Alcohol use: Never    Drug use: Never       Allergies: Review of patient's allergies indicates:  No Known Allergies     Family History:   Family History   Problem Relation Age of Onset    Diabetes Mother     Hypertension Mother         Review of Systems:  Review of Systems   Constitutional: Negative for activity change and appetite change.   HENT: Negative for congestion and dental problem.    Eyes: Negative for visual disturbance.   Respiratory: Negative for chest tightness and shortness of breath.    Cardiovascular: Negative for chest pain.   Gastrointestinal: Negative for abdominal distention and abdominal pain.   Genitourinary: Positive for frequency and urgency. Negative for decreased urine volume, difficulty urinating, dysuria, enuresis, flank pain, genital sores, hematuria, penile discharge, penile pain, penile swelling, scrotal swelling and testicular pain.   Musculoskeletal:  Negative for back pain and neck pain.   Skin: Negative for color change.   Neurological: Negative for dizziness.   Hematological: Negative for adenopathy.   Psychiatric/Behavioral: Negative for agitation, behavioral problems and confusion.       Physical Exam:  Physical Exam  Vitals and nursing note reviewed.   Constitutional:       Appearance: He is well-developed.   HENT:      Head: Normocephalic.   Eyes:      Pupils: Pupils are equal, round, and reactive to light.   Cardiovascular:      Rate and Rhythm: Normal rate and regular rhythm.      Heart sounds: Normal heart sounds.   Pulmonary:      Effort: Pulmonary effort is normal.      Breath sounds: Normal breath sounds.   Abdominal:      General: Bowel sounds are normal.      Palpations: Abdomen is soft.   Musculoskeletal:         General: Normal range of motion.      Cervical back: Normal range of motion and neck supple.   Skin:     General: Skin is warm and dry.   Neurological:      Mental Status: He is alert and oriented to person, place, and time.   Psychiatric:         Behavior: Behavior normal.       Urinalysis:  Trace leukocytes, white blood cells 0-5, negative bacteria.  Bladder scan:  96 cc.     Assessment/Plan:   1. Elevated PSA:  Patient is now under the care of [facility].  Will defer treatment to [facility].  Did explain that we can assist in any needs.    2. Frequency/urgency/nocturia:  Did discuss possible BPH.  Discussed some treatment options of Flomax or Uroxatral.  Patient not interested in any interventions at this time.  Will continue to monitor.    Since patient is being monitored by [facility] for his elevated PSA.  Recommend follow-up in 1 year, sooner if needed.  Problem List Items Addressed This Visit        Renal/    Elevated PSA - Primary    Overview     Biopsy on 04/09/2021 due to PSA of 5.31.  12/12 - benign.    Recent PSA history.  07/2021 - 8.63.  09/2021 - 7.06.  Started on antibiotics.  11/2021 - 5.81.  01/2022 - 6.99.            Current Assessment & Plan     Patient is now being followed by [facility].             Other Visit Diagnoses     Urinary frequency        Relevant Orders    POCT Bladder Scan    POCT Urinalysis (w/Micro Option)    Urinary urgency        Relevant Orders    POCT Bladder Scan    POCT Urinalysis (w/Micro Option)    Nocturia        Relevant Orders    POCT Bladder Scan    POCT Urinalysis (w/Micro Option)